# Patient Record
Sex: MALE | Race: WHITE | Employment: FULL TIME | URBAN - METROPOLITAN AREA
[De-identification: names, ages, dates, MRNs, and addresses within clinical notes are randomized per-mention and may not be internally consistent; named-entity substitution may affect disease eponyms.]

---

## 2018-08-23 ENCOUNTER — APPOINTMENT (OUTPATIENT)
Dept: GENERAL RADIOLOGY | Age: 23
End: 2018-08-23
Payer: COMMERCIAL

## 2018-08-23 ENCOUNTER — HOSPITAL ENCOUNTER (EMERGENCY)
Age: 23
Discharge: HOME | End: 2018-08-23
Attending: EMERGENCY MEDICINE
Payer: COMMERCIAL

## 2018-08-23 VITALS
HEIGHT: 70 IN | HEART RATE: 75 BPM | WEIGHT: 185 LBS | RESPIRATION RATE: 14 BRPM | OXYGEN SATURATION: 100 % | TEMPERATURE: 98 F | SYSTOLIC BLOOD PRESSURE: 141 MMHG | DIASTOLIC BLOOD PRESSURE: 83 MMHG | BODY MASS INDEX: 26.48 KG/M2

## 2018-08-23 DIAGNOSIS — S62.326A CLOSED DISPLACED FRACTURE OF SHAFT OF FIFTH METACARPAL BONE OF RIGHT HAND, INITIAL ENCOUNTER: ICD-10-CM

## 2018-08-23 DIAGNOSIS — S62.339A CLOSED BOXER'S FRACTURE, INITIAL ENCOUNTER: Primary | ICD-10-CM

## 2018-08-23 PROCEDURE — 73130 X-RAY EXAM OF HAND: CPT

## 2018-08-23 PROCEDURE — 6370000000 HC RX 637 (ALT 250 FOR IP): Performed by: EMERGENCY MEDICINE

## 2018-08-23 PROCEDURE — 4500000023 HC ED LEVEL 3 PROCEDURE

## 2018-08-23 PROCEDURE — 99283 EMERGENCY DEPT VISIT LOW MDM: CPT

## 2018-08-23 RX ORDER — LIDOCAINE HYDROCHLORIDE AND EPINEPHRINE 10; 10 MG/ML; UG/ML
20 INJECTION, SOLUTION INFILTRATION; PERINEURAL ONCE
Status: DISCONTINUED | OUTPATIENT
Start: 2018-08-23 | End: 2018-08-23

## 2018-08-23 RX ORDER — IBUPROFEN 600 MG/1
600 TABLET ORAL EVERY 6 HOURS PRN
Qty: 30 TABLET | Refills: 0 | Status: SHIPPED | OUTPATIENT
Start: 2018-08-23

## 2018-08-23 RX ORDER — BUDESONIDE AND FORMOTEROL FUMARATE DIHYDRATE 160; 4.5 UG/1; UG/1
2 AEROSOL RESPIRATORY (INHALATION) 2 TIMES DAILY
COMMUNITY

## 2018-08-23 RX ORDER — IBUPROFEN 400 MG/1
800 TABLET ORAL ONCE
Status: COMPLETED | OUTPATIENT
Start: 2018-08-23 | End: 2018-08-23

## 2018-08-23 RX ADMIN — IBUPROFEN 800 MG: 400 TABLET ORAL at 12:24

## 2018-08-23 ASSESSMENT — PAIN DESCRIPTION - LOCATION: LOCATION: HAND

## 2018-08-23 ASSESSMENT — PAIN DESCRIPTION - PAIN TYPE: TYPE: ACUTE PAIN

## 2018-08-23 ASSESSMENT — PAIN SCALES - GENERAL: PAINLEVEL_OUTOF10: 6

## 2018-08-23 ASSESSMENT — PAIN DESCRIPTION - ORIENTATION: ORIENTATION: RIGHT

## 2018-08-23 ASSESSMENT — PAIN DESCRIPTION - DESCRIPTORS: DESCRIPTORS: ACHING

## 2018-08-23 NOTE — ED NOTES
Covered right arm with gauze padding, 4\" ortho glass splint ulnar, and 2\" and 3\" Ace wrap, pt tolerated well.      [address] [place], [place]  08/23/18 5329
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
## 2018-08-23 NOTE — ED NOTES
Patient given prescription, work note, discharge instructions verbal and written, patient verbalized understanding. Alert/oriented X4, Clear speech.   Patient exhibits no distress, ambulates with steady gait per self leaving unit, no further request.     Conchis Sinha RN  08/23/18 0439

## 2018-08-23 NOTE — ED PROVIDER NOTES
EMERGENCY DEPARTMENT PHYSICIAN DOCUMENTATION      CHIEF COMPLAINT  Hand Injury (right)    Patient information was obtained from patient. History/Exam limitations: none. HISTORY OF PRESENT ILLNESS  Kimberly Martinez is a 26 y.o. male with complaint of Hand Injury (right)  . Injury occurred just pta. Mechanism: punched wall; no skin injury  Pain is throbbing, sharp, worse with touching and using. No radiation. No fevers or chills. REVIEW OF SYSTEMS  A full 10 point Review of Systems was performed and is negative aside from pertinent positives mentioned in HPI    ALLERGIES:  No Known Allergies    PAST HISTORY  Past Medical History:   Diagnosis Date    Asthma        History reviewed. No pertinent family history. No current facility-administered medications on file prior to encounter. No current outpatient prescriptions on file prior to encounter. Social History   Substance Use Topics    Smoking status: Current Some Day Smoker    Smokeless tobacco: Never Used    Alcohol use Yes      Comment: social         EXAM:   Presentation Vital Signs: BP (!) 141/83   Pulse 75   Temp 98 °F (36.7 °C)   Resp 14   Ht 5' 10\" (1.778 m)   Wt 83.9 kg (185 lb)   SpO2 100%   BMI 26.54 kg/m²   General: Well nourished, no acute distress  Head: No traumatic injury  ENT: MMM, no facial asymmetry, no nasal discharge  Eyes: EOM  Neck: No tracheal deviation or stridor  Skin: no pallor, erythema, lesions or other abnormalities on exposed skin of face and arms  Extremities: R 5th  Metacarpal is deformed mildly with overlap of distal fragment dorally over proximal fragment. No skin injury  Good cap refill all digits and radial pulse  Good extensor and flexor movements of all digits  Good sensatio clement digits  Neurologic: Alert, oriented x 3.   No focal deficits upon moving arms and legs  Psychiatric: Appropriate demeanor without agitation or internal stimulation      MEDICAL DECISION MAKING  Pt here with isolated R

## 2018-08-23 NOTE — LETTER
[address] Emergency Department  [address] [address]  Phone: [phone]  Fax: [phone]             August 24, 2018    Patient: Breanne Pelaez   YOB: 1995   Date of Visit: 8/23/2018       To Whom It May Concern:    Ronnie Colorado was seen and treated in our emergency department on 8/23/2018. He may return to work on 8/24/18 with light duty due to right hand injury. Must be cleared by ortho.       Sincerely,             Signature:__________________________________

## 2018-08-28 ENCOUNTER — TELEPHONE (OUTPATIENT)
Dept: ORTHOPEDIC SURGERY | Age: 23
End: 2018-08-28

## 2018-08-28 ENCOUNTER — OFFICE VISIT (OUTPATIENT)
Dept: ORTHOPEDIC SURGERY | Age: 23
End: 2018-08-28

## 2018-08-28 VITALS
DIASTOLIC BLOOD PRESSURE: 62 MMHG | BODY MASS INDEX: 26.48 KG/M2 | HEIGHT: 70 IN | SYSTOLIC BLOOD PRESSURE: 120 MMHG | HEART RATE: 73 BPM | WEIGHT: 185 LBS

## 2018-08-28 DIAGNOSIS — S62.326A CLOSED DISPLACED FRACTURE OF SHAFT OF FIFTH METACARPAL BONE OF RIGHT HAND, INITIAL ENCOUNTER: Primary | ICD-10-CM

## 2018-08-28 PROCEDURE — L3809 WHFO W/O JOINTS PRE OTS: HCPCS | Performed by: ORTHOPAEDIC SURGERY

## 2018-08-28 PROCEDURE — 4004F PT TOBACCO SCREEN RCVD TLK: CPT | Performed by: ORTHOPAEDIC SURGERY

## 2018-08-28 PROCEDURE — G8419 CALC BMI OUT NRM PARAM NOF/U: HCPCS | Performed by: ORTHOPAEDIC SURGERY

## 2018-08-28 PROCEDURE — G8427 DOCREV CUR MEDS BY ELIG CLIN: HCPCS | Performed by: ORTHOPAEDIC SURGERY

## 2018-08-28 PROCEDURE — 99203 OFFICE O/P NEW LOW 30 MIN: CPT | Performed by: ORTHOPAEDIC SURGERY

## 2018-08-28 RX ORDER — ALBUTEROL SULFATE 90 UG/1
2 AEROSOL, METERED RESPIRATORY (INHALATION) EVERY 6 HOURS PRN
COMMUNITY

## 2018-08-28 NOTE — LETTER
[facility]  [address] [address]  Phone: [phone]  Fax: [phone]    Lin Alicea MD        August 28, 2018     Patient: Naif Roberson   YOB: 1995   Date of Visit: 8/28/2018       To Whom It May Concern: It is my medical opinion that [facility] may return to light duty immediately with the following restrictions: no lifting or gripping with right hand. .    If you have any questions or concerns, please don't hesitate to call.     Sincerely,          Lin Alicea MD

## 2018-08-28 NOTE — PROGRESS NOTES
functional support and facilitate healing. The patient was educated and fit by a healthcare professional with expert knowledge and specialization in brace application while under the direct supervision of the physician. Verbal and written instructions for the use of and application of this item were provided. They were instructed to contact the office immediately should the brace result in increased pain, decreased sensation, increased swelling or worsening of the condition. Treatment Plan:  I discussed results of imaging with the patient today as well as treatment options for his right hand fracture. We discussed both operative and nonoperative intervention. The risks of nonoperative intervention include the possibility of continued malrotation, malunion or nonunion as well as continued immobilization in the possibility of stiffness. Benefits of surgical intervention including stabilization and early range of motion as well as more predictable stability and alignment of the fracture were discussed. We also discussed risks of surgery today. The risks and benefits of surgical fixation versus non-operative management were discussed thoroughly. These included, but were not limited to infection, tendon or nerve injury, stiffness or pain of the hand or fingers long-term, malunion, nonunion, malrotation, implant failure, tendon rupture, scar sensitivity, adverse effects of anesthesia (stroke or death), and possible need for hardware removal or further surgery such as tenolysis. Specifically, based on alignment and fracture orientation I would recommend surgical intervention as I think it would benefit the patient to allow for earlier range of motion as well as to correct his malalignment. In addition to comminution and location of fracture does makes this for a higher chance of further displacement.  My plan would be for open right reduction internal fixation with plate and screw construct and possible use

## 2018-08-29 ENCOUNTER — TELEPHONE (OUTPATIENT)
Dept: ORTHOPEDIC SURGERY | Age: 23
End: 2018-08-29

## 2018-08-29 NOTE — PROGRESS NOTES
[facility] / [facility] ([facility]) [address] [facility], [address]    Acknowledgment of Informed Consent for Surgical or Medical Procedure and Sedation  I agree to allow doctor(s) Jaden Simms and his/her associates or assistants, including residents and/or other qualified medical practitioner to perform the following medical treatment or procedure and to administer or direct the administration of sedation as necessary:  Procedure(s): OPERATIVE FIXATION OF RIGHT SMALL FINGER, METACARPAL SHAFT FRACTURE, INCLUDING OPEN REDUCTION INTERNAL FIXATION VERSUS PERCUTANEOUS PIN FIXATION   My doctor has explained the following regarding the proposed procedure:   the explanation of the procedure   the benefits of the procedure   the potential problems that might occur during recuperation   the risks and side effects of the procedure which could include but are not limited to severe blood loss, infection, stroke or death   the benefits, risks and side effect of alternative procedures including the consequences of declining this procedure or any alternative procedures   the likelihood of achieving satisfactory results. I acknowledge no guarantee or assurance has been made to me regarding the results. I understand that during the course of this treatment/procedure, unforeseen conditions can occur which require an additional or different procedure. I agree to allow my physician or assistants to perform such extension of the original procedure as they may find necessary. I understand that sedation will often result in temporary impairment of memory and fine motor skills and that sedation can occasionally progress to a state of deep sedation or general anesthesia. I understand the risks of anesthesia for surgery include, but are not limited to, sore throat, hoarseness, injury to face, mouth, or teeth; nausea; headache; injury to blood vessels or nerves; death, brain damage, or paralysis.     I understand

## 2018-08-30 ENCOUNTER — ANESTHESIA EVENT (OUTPATIENT)
Dept: OPERATING ROOM | Age: 23
End: 2018-08-30
Payer: COMMERCIAL

## 2018-08-30 RX ORDER — FLUTICASONE PROPIONATE 50 MCG
1 SPRAY, SUSPENSION (ML) NASAL PRN
COMMUNITY

## 2018-08-30 RX ORDER — CETIRIZINE HYDROCHLORIDE 10 MG/1
10 TABLET ORAL PRN
COMMUNITY

## 2018-08-30 NOTE — PROGRESS NOTES
[address] [employer]                          Date of Procedure 8/31/18 Time of Procedure 0730  PRIOR TO PROCEDURE DATE:  1. Please follow any guidelines/instructions prior to your procedure as advised by your surgeon. 2. Arrange for someone to drive you home and be with you for the first 24 hours after discharge for your safety after your procedure for which you received sedation. Ensure it is someone we can share information with regarding your discharge. 3. You must contact your surgeon for instructions IF:   You are taking any blood thinners, aspirin, anti-inflammatory or vitamin E.   There is a change in your physical condition such as a cold, fever, rash, cuts, sores or any other infection, especially near your surgical site. 4. Do not drink alcohol the day before or day of your procedure. 5. A Pre-op History and Physical for surgery MUST be completed by your Physician or Urgent Care within 30 days of your procedure date. Please bring a copy with you on the day of your procedure and along with any other testing performed. THE DAY OF YOUR PROCEDURE:  1. Follow instructions for ARRIVAL TIME as DIRECTED BY YOUR SURGEON. If your surgeon does not give you a specific arrival time, please arrive at 0530 PER PT    2. Enter the MAIN entrance from "[employer] and follow the signs to the free [employer] parking (offered free of charge 6am-5pm). 3. Enter the Main Entrance of the hospital (do not enter from the lower level of the parking garage). Upon entrance, check in with the  at the main desk on your left. If no one is available at the desk, proceed into the [facility] Waiting Room and go through the door directly into the [facility]. There is a Check-in desk ACROSS from Room 5 (marked with a sign hanging from the ceiling). The phone number for the surgery center is [phone].     4. Please call [phone] option #2 any large items you may need brought in by your family after your arrival to your hospital room. 15. If you have a Living Will or Durable Power of , please bring a copy on the day of your procedure. 15. With your permission, one family member may accompany you while you are being prepared for surgery. Once you are ready, additional family members may join you. HOW WE KEEP YOU SAFE and WORK TO PREVENT SURGICAL SITE INFECTIONS:  1. Health care workers should always check your ID bracelet to verify your name and birth date. You will be asked many times to state your name, date of birth, and allergies. 2. Health care workers should always clean their hands with soap or alcohol gel before providing care to you. It is okay to ask anyone if they cleaned their hands before they touch you. 3. You will be actively involved in verifying the type of procedure you are having and ensuring the correct surgical site. This will be confirmed multiple times prior to your procedure. Do NOT yvan your surgery site UNLESS instructed to by your surgeon. 4. Do not shave or wax for 72 hours prior to procedure near your operative site. Shaving with a razor can irritate your skin and make it easier to develop an infection. On the day of your procedure, any hair that needs to be removed near the surgical site will be clipped by a healthcare worker using a special clippers designed to avoid skin irritation. 5. When you are in the operating room, your surgical site will be cleansed with a special soap, and in most cases, you will be given an antibiotic before the surgery begins. What to expect AFTER YOUR PROCEDURE:  1. Immediately following your procedure, your will be taken to the PACU for the first phase of your recovery. Your nurse will help you recover from any potential side effects of anesthesia, such as extreme drowsiness, changes in your vital signs or breathing patterns.  Nausea, headache, muscle aches, or sore throat may also occur after anesthesia. Your nurse will help you manage these potential side effects. 2. For comfort and safety, arrange to have someone at home with you for the first 24 hours after discharge. 3. You and your family will be given written instructions about your diet, activity, dressing care, medications, and return visits. 4. Once at home, should issues with nausea, pain, or bleeding occur, or should you notice any signs of infection, you should call your surgeon. 5. Always clean your hands before and after caring for your wound. Do not let your family touch your surgery site without cleaning their hands. 6. Narcotic pain medications can cause significant constipation. You may want to add a stool softener to your postoperative medication schedule or speak to your surgeon on how best to manage this SIDE EFFECT. SPECIAL INSTRUCTIONS     Thank you for allowing us to care for you. We strive to exceed your expectations in the delivery of care and service provided to you and your family. If you need to contact us for any reason, please call us at [phone]    Instructions reviewed with patient during preadmission testing phone interview. Jeana Jones. 8/30/2018 .10:03 AM      ADDITIONAL EDUCATIONAL INFORMATION REVIEWED PER PHONE WITH YOU AND/OR YOUR FAMILY:  No Bring a urine sample on day of surgery  Yes Pain Goal-Taking Control of Your Pain  Yes FAQs about Surgical Site Infections  No Hibiclens® Bathing Instructions   Yes Antibacterial Soap  No Sesar® Wipes Bathing Instructions (Obtained from: https://www.[employer]/. pdf )  No Incentive Spirometer Education  No Other

## 2018-08-31 ENCOUNTER — HOSPITAL ENCOUNTER (OUTPATIENT)
Age: 23
Setting detail: OUTPATIENT SURGERY
Discharge: HOME | End: 2018-08-31
Attending: ORTHOPAEDIC SURGERY | Admitting: ORTHOPAEDIC SURGERY
Payer: COMMERCIAL

## 2018-08-31 ENCOUNTER — ANESTHESIA (OUTPATIENT)
Dept: OPERATING ROOM | Age: 23
End: 2018-08-31
Payer: COMMERCIAL

## 2018-08-31 VITALS
HEIGHT: 70 IN | BODY MASS INDEX: 26.48 KG/M2 | SYSTOLIC BLOOD PRESSURE: 124 MMHG | DIASTOLIC BLOOD PRESSURE: 72 MMHG | WEIGHT: 185 LBS | HEART RATE: 73 BPM | RESPIRATION RATE: 16 BRPM | OXYGEN SATURATION: 93 % | TEMPERATURE: 97.5 F

## 2018-08-31 VITALS — OXYGEN SATURATION: 99 % | DIASTOLIC BLOOD PRESSURE: 53 MMHG | SYSTOLIC BLOOD PRESSURE: 103 MMHG

## 2018-08-31 DIAGNOSIS — S62.326A CLOSED DISPLACED FRACTURE OF SHAFT OF FIFTH METACARPAL BONE OF RIGHT HAND, INITIAL ENCOUNTER: Primary | ICD-10-CM

## 2018-08-31 PROCEDURE — 7100000010 HC PHASE II RECOVERY - FIRST 15 MIN: Performed by: ORTHOPAEDIC SURGERY

## 2018-08-31 PROCEDURE — 7100000000 HC PACU RECOVERY - FIRST 15 MIN: Performed by: ORTHOPAEDIC SURGERY

## 2018-08-31 PROCEDURE — 6360000002 HC RX W HCPCS: Performed by: ANESTHESIOLOGY

## 2018-08-31 PROCEDURE — 6360000002 HC RX W HCPCS: Performed by: ORTHOPAEDIC SURGERY

## 2018-08-31 PROCEDURE — 7100000011 HC PHASE II RECOVERY - ADDTL 15 MIN: Performed by: ORTHOPAEDIC SURGERY

## 2018-08-31 PROCEDURE — 2500000003 HC RX 250 WO HCPCS

## 2018-08-31 PROCEDURE — 2580000003 HC RX 258: Performed by: ORTHOPAEDIC SURGERY

## 2018-08-31 PROCEDURE — 7100000001 HC PACU RECOVERY - ADDTL 15 MIN: Performed by: ORTHOPAEDIC SURGERY

## 2018-08-31 PROCEDURE — 3700000000 HC ANESTHESIA ATTENDED CARE: Performed by: ORTHOPAEDIC SURGERY

## 2018-08-31 PROCEDURE — 6370000000 HC RX 637 (ALT 250 FOR IP): Performed by: ORTHOPAEDIC SURGERY

## 2018-08-31 PROCEDURE — 64415 NJX AA&/STRD BRCH PLXS IMG: CPT | Performed by: ANESTHESIOLOGY

## 2018-08-31 PROCEDURE — 3700000001 HC ADD 15 MINUTES (ANESTHESIA): Performed by: ORTHOPAEDIC SURGERY

## 2018-08-31 PROCEDURE — 3600000014 HC SURGERY LEVEL 4 ADDTL 15MIN: Performed by: ORTHOPAEDIC SURGERY

## 2018-08-31 PROCEDURE — 6360000002 HC RX W HCPCS

## 2018-08-31 PROCEDURE — 2709999900 HC NON-CHARGEABLE SUPPLY: Performed by: ORTHOPAEDIC SURGERY

## 2018-08-31 PROCEDURE — 3600000004 HC SURGERY LEVEL 4 BASE: Performed by: ORTHOPAEDIC SURGERY

## 2018-08-31 PROCEDURE — 2580000003 HC RX 258: Performed by: ANESTHESIOLOGY

## 2018-08-31 PROCEDURE — 2580000003 HC RX 258

## 2018-08-31 PROCEDURE — C1713 ANCHOR/SCREW BN/BN,TIS/BN: HCPCS | Performed by: ORTHOPAEDIC SURGERY

## 2018-08-31 DEVICE — IMPLANTABLE DEVICE: Type: IMPLANTABLE DEVICE | Site: FINGERS | Status: FUNCTIONAL

## 2018-08-31 RX ORDER — HYDROCODONE BITARTRATE AND ACETAMINOPHEN 5; 325 MG/1; MG/1
1 TABLET ORAL
Status: COMPLETED | OUTPATIENT
Start: 2018-08-31 | End: 2018-08-31

## 2018-08-31 RX ORDER — FENTANYL CITRATE 50 UG/ML
INJECTION, SOLUTION INTRAMUSCULAR; INTRAVENOUS PRN
Status: DISCONTINUED | OUTPATIENT
Start: 2018-08-31 | End: 2018-08-31 | Stop reason: SDUPTHER

## 2018-08-31 RX ORDER — SODIUM CHLORIDE 0.9 % (FLUSH) 0.9 %
10 SYRINGE (ML) INJECTION EVERY 12 HOURS SCHEDULED
Status: DISCONTINUED | OUTPATIENT
Start: 2018-08-31 | End: 2018-08-31 | Stop reason: HOSPADM

## 2018-08-31 RX ORDER — FENTANYL CITRATE 50 UG/ML
50 INJECTION, SOLUTION INTRAMUSCULAR; INTRAVENOUS EVERY 5 MIN PRN
Status: DISCONTINUED | OUTPATIENT
Start: 2018-08-31 | End: 2018-08-31 | Stop reason: HOSPADM

## 2018-08-31 RX ORDER — LABETALOL HYDROCHLORIDE 5 MG/ML
5 INJECTION, SOLUTION INTRAVENOUS EVERY 10 MIN PRN
Status: DISCONTINUED | OUTPATIENT
Start: 2018-08-31 | End: 2018-08-31 | Stop reason: HOSPADM

## 2018-08-31 RX ORDER — FENTANYL CITRATE 50 UG/ML
100 INJECTION, SOLUTION INTRAMUSCULAR; INTRAVENOUS ONCE
Status: COMPLETED | OUTPATIENT
Start: 2018-08-31 | End: 2018-08-31

## 2018-08-31 RX ORDER — SODIUM CHLORIDE 0.9 % (FLUSH) 0.9 %
10 SYRINGE (ML) INJECTION PRN
Status: DISCONTINUED | OUTPATIENT
Start: 2018-08-31 | End: 2018-08-31 | Stop reason: HOSPADM

## 2018-08-31 RX ORDER — SODIUM CHLORIDE, SODIUM LACTATE, POTASSIUM CHLORIDE, CALCIUM CHLORIDE 600; 310; 30; 20 MG/100ML; MG/100ML; MG/100ML; MG/100ML
INJECTION, SOLUTION INTRAVENOUS CONTINUOUS PRN
Status: DISCONTINUED | OUTPATIENT
Start: 2018-08-31 | End: 2018-08-31 | Stop reason: SDUPTHER

## 2018-08-31 RX ORDER — ROPIVACAINE HYDROCHLORIDE 5 MG/ML
INJECTION, SOLUTION EPIDURAL; INFILTRATION; PERINEURAL
Status: DISCONTINUED
Start: 2018-08-31 | End: 2018-08-31 | Stop reason: HOSPADM

## 2018-08-31 RX ORDER — LIDOCAINE HYDROCHLORIDE 10 MG/ML
1 INJECTION, SOLUTION EPIDURAL; INFILTRATION; INTRACAUDAL; PERINEURAL
Status: DISCONTINUED | OUTPATIENT
Start: 2018-08-31 | End: 2018-08-31 | Stop reason: HOSPADM

## 2018-08-31 RX ORDER — ONDANSETRON 2 MG/ML
INJECTION INTRAMUSCULAR; INTRAVENOUS PRN
Status: DISCONTINUED | OUTPATIENT
Start: 2018-08-31 | End: 2018-08-31 | Stop reason: SDUPTHER

## 2018-08-31 RX ORDER — HYDROCODONE BITARTRATE AND ACETAMINOPHEN 5; 325 MG/1; MG/1
1 TABLET ORAL EVERY 6 HOURS PRN
Qty: 18 TABLET | Refills: 0 | Status: SHIPPED | OUTPATIENT
Start: 2018-08-31 | End: 2018-09-03

## 2018-08-31 RX ORDER — ONDANSETRON 2 MG/ML
4 INJECTION INTRAMUSCULAR; INTRAVENOUS
Status: DISCONTINUED | OUTPATIENT
Start: 2018-08-31 | End: 2018-08-31 | Stop reason: HOSPADM

## 2018-08-31 RX ORDER — SODIUM CHLORIDE, SODIUM LACTATE, POTASSIUM CHLORIDE, CALCIUM CHLORIDE 600; 310; 30; 20 MG/100ML; MG/100ML; MG/100ML; MG/100ML
INJECTION, SOLUTION INTRAVENOUS CONTINUOUS
Status: DISCONTINUED | OUTPATIENT
Start: 2018-08-31 | End: 2018-08-31 | Stop reason: HOSPADM

## 2018-08-31 RX ORDER — MIDAZOLAM HYDROCHLORIDE 1 MG/ML
INJECTION INTRAMUSCULAR; INTRAVENOUS PRN
Status: DISCONTINUED | OUTPATIENT
Start: 2018-08-31 | End: 2018-08-31 | Stop reason: SDUPTHER

## 2018-08-31 RX ORDER — MAGNESIUM HYDROXIDE 1200 MG/15ML
LIQUID ORAL CONTINUOUS PRN
Status: DISCONTINUED | OUTPATIENT
Start: 2018-08-31 | End: 2018-08-31 | Stop reason: HOSPADM

## 2018-08-31 RX ORDER — DEXAMETHASONE SODIUM PHOSPHATE 4 MG/ML
INJECTION, SOLUTION INTRA-ARTICULAR; INTRALESIONAL; INTRAMUSCULAR; INTRAVENOUS; SOFT TISSUE PRN
Status: DISCONTINUED | OUTPATIENT
Start: 2018-08-31 | End: 2018-08-31 | Stop reason: SDUPTHER

## 2018-08-31 RX ORDER — MIDAZOLAM HYDROCHLORIDE 1 MG/ML
2 INJECTION INTRAMUSCULAR; INTRAVENOUS
Status: COMPLETED | OUTPATIENT
Start: 2018-08-31 | End: 2018-08-31

## 2018-08-31 RX ORDER — MEPERIDINE HYDROCHLORIDE 25 MG/ML
12.5 INJECTION INTRAMUSCULAR; INTRAVENOUS; SUBCUTANEOUS EVERY 5 MIN PRN
Status: DISCONTINUED | OUTPATIENT
Start: 2018-08-31 | End: 2018-08-31 | Stop reason: HOSPADM

## 2018-08-31 RX ORDER — DOCUSATE SODIUM 100 MG/1
100 CAPSULE, LIQUID FILLED ORAL 2 TIMES DAILY PRN
Qty: 40 CAPSULE | Refills: 0 | Status: SHIPPED | OUTPATIENT
Start: 2018-08-31

## 2018-08-31 RX ORDER — LIDOCAINE HYDROCHLORIDE 20 MG/ML
INJECTION, SOLUTION EPIDURAL; INFILTRATION; INTRACAUDAL; PERINEURAL PRN
Status: DISCONTINUED | OUTPATIENT
Start: 2018-08-31 | End: 2018-08-31 | Stop reason: SDUPTHER

## 2018-08-31 RX ORDER — PROPOFOL 10 MG/ML
INJECTION, EMULSION INTRAVENOUS PRN
Status: DISCONTINUED | OUTPATIENT
Start: 2018-08-31 | End: 2018-08-31 | Stop reason: SDUPTHER

## 2018-08-31 RX ORDER — MORPHINE SULFATE 2 MG/ML
2 INJECTION, SOLUTION INTRAMUSCULAR; INTRAVENOUS EVERY 5 MIN PRN
Status: DISCONTINUED | OUTPATIENT
Start: 2018-08-31 | End: 2018-08-31 | Stop reason: HOSPADM

## 2018-08-31 RX ADMIN — HYDROCODONE BITARTRATE AND ACETAMINOPHEN 1 TABLET: 5; 325 TABLET ORAL at 11:47

## 2018-08-31 RX ADMIN — LIDOCAINE HYDROCHLORIDE 50 MG: 20 INJECTION, SOLUTION EPIDURAL; INFILTRATION; INTRACAUDAL; PERINEURAL at 08:45

## 2018-08-31 RX ADMIN — DEXAMETHASONE SODIUM PHOSPHATE 4 MG: 4 INJECTION, SOLUTION INTRAMUSCULAR; INTRAVENOUS at 10:15

## 2018-08-31 RX ADMIN — Medication 2 G: at 08:56

## 2018-08-31 RX ADMIN — FENTANYL CITRATE 50 MCG: 50 INJECTION INTRAMUSCULAR; INTRAVENOUS at 11:09

## 2018-08-31 RX ADMIN — FENTANYL CITRATE 50 MCG: 50 INJECTION INTRAMUSCULAR; INTRAVENOUS at 09:00

## 2018-08-31 RX ADMIN — SODIUM CHLORIDE, POTASSIUM CHLORIDE, SODIUM LACTATE AND CALCIUM CHLORIDE: 600; 310; 30; 20 INJECTION, SOLUTION INTRAVENOUS at 06:30

## 2018-08-31 RX ADMIN — PROPOFOL 200 MG: 10 INJECTION, EMULSION INTRAVENOUS at 08:45

## 2018-08-31 RX ADMIN — SODIUM CHLORIDE, SODIUM LACTATE, POTASSIUM CHLORIDE, AND CALCIUM CHLORIDE: 600; 310; 30; 20 INJECTION, SOLUTION INTRAVENOUS at 08:35

## 2018-08-31 RX ADMIN — MIDAZOLAM HYDROCHLORIDE 2 MG: 2 INJECTION, SOLUTION INTRAMUSCULAR; INTRAVENOUS at 07:32

## 2018-08-31 RX ADMIN — FENTANYL CITRATE 100 MCG: 50 INJECTION, SOLUTION INTRAMUSCULAR; INTRAVENOUS at 07:32

## 2018-08-31 RX ADMIN — ONDANSETRON 4 MG: 2 INJECTION INTRAMUSCULAR; INTRAVENOUS at 10:15

## 2018-08-31 RX ADMIN — Medication 2 G: at 08:40

## 2018-08-31 RX ADMIN — MIDAZOLAM HYDROCHLORIDE 2 MG: 1 INJECTION INTRAMUSCULAR; INTRAVENOUS at 08:40

## 2018-08-31 RX ADMIN — SODIUM CHLORIDE, SODIUM LACTATE, POTASSIUM CHLORIDE, AND CALCIUM CHLORIDE: 600; 310; 30; 20 INJECTION, SOLUTION INTRAVENOUS at 09:25

## 2018-08-31 RX ADMIN — FENTANYL CITRATE 50 MCG: 50 INJECTION INTRAMUSCULAR; INTRAVENOUS at 08:45

## 2018-08-31 ASSESSMENT — PULMONARY FUNCTION TESTS
PIF_VALUE: 16
PIF_VALUE: 14
PIF_VALUE: 16
PIF_VALUE: 13
PIF_VALUE: 16
PIF_VALUE: 15
PIF_VALUE: 16
PIF_VALUE: 9
PIF_VALUE: 16
PIF_VALUE: 10
PIF_VALUE: 16
PIF_VALUE: 8
PIF_VALUE: 15
PIF_VALUE: 9
PIF_VALUE: 16
PIF_VALUE: 1
PIF_VALUE: 16
PIF_VALUE: 9
PIF_VALUE: 16
PIF_VALUE: 9
PIF_VALUE: 16
PIF_VALUE: 6
PIF_VALUE: 1
PIF_VALUE: 16
PIF_VALUE: 1
PIF_VALUE: 16
PIF_VALUE: 9
PIF_VALUE: 16
PIF_VALUE: 14
PIF_VALUE: 16
PIF_VALUE: 16
PIF_VALUE: 13
PIF_VALUE: 16
PIF_VALUE: 16
PIF_VALUE: 14
PIF_VALUE: 16
PIF_VALUE: 8
PIF_VALUE: 16
PIF_VALUE: 8
PIF_VALUE: 16
PIF_VALUE: 1
PIF_VALUE: 9
PIF_VALUE: 16
PIF_VALUE: 14
PIF_VALUE: 9
PIF_VALUE: 16
PIF_VALUE: 8
PIF_VALUE: 16
PIF_VALUE: 1
PIF_VALUE: 16
PIF_VALUE: 6
PIF_VALUE: 16
PIF_VALUE: 1
PIF_VALUE: 16
PIF_VALUE: 14
PIF_VALUE: 16
PIF_VALUE: 16
PIF_VALUE: 9

## 2018-08-31 ASSESSMENT — PAIN - FUNCTIONAL ASSESSMENT
PAIN_FUNCTIONAL_ASSESSMENT: 0-10
PAIN_FUNCTIONAL_ASSESSMENT: 0-10

## 2018-08-31 ASSESSMENT — PAIN SCALES - GENERAL
PAINLEVEL_OUTOF10: 7
PAINLEVEL_OUTOF10: 4
PAINLEVEL_OUTOF10: 0
PAINLEVEL_OUTOF10: 6

## 2018-08-31 NOTE — ANESTHESIA PRE PROCEDURE
allergies  Causes nasal cogestion       Problem List:    Patient Active Problem List   Diagnosis Code    Closed displaced fracture of shaft of fifth metacarpal bone of right hand S62.326A       Past Medical History:        Diagnosis Date    Asthma     Eczema     ARMS    Seasonal allergies        Past Surgical History:        Procedure Laterality Date    FRACTURE SURGERY Right 08/31/2018    5th metatarsal       Social History:    Social History   Substance Use Topics    Smoking status: Current Some Day Smoker    Smokeless tobacco: Never Used      Comment: SOCIALLY    Alcohol use Yes      Comment: social                                Ready to quit: Yes  Counseling given: No      Vital Signs (Current):   Vitals:    08/30/18 0959 08/31/18 0612   BP:  121/71   Pulse:  51   Resp:  16   Temp:  97.9 °F (36.6 °C)   TempSrc:  Oral   SpO2:  97%   Weight: 185 lb (83.9 kg) 185 lb (83.9 kg)   Height: 5' 10\" (1.778 m) 5' 10\" (1.778 m)                                              BP Readings from Last 3 Encounters:   08/31/18 121/71   08/28/18 120/62   08/23/18 (!) 141/83       NPO Status: Time of last liquid consumption: 2330                        Time of last solid consumption: 2330                        Date of last liquid consumption: 08/30/18                        Date of last solid food consumption: 08/30/18    BMI:   Wt Readings from Last 3 Encounters:   08/31/18 185 lb (83.9 kg)   08/28/18 185 lb (83.9 kg)   08/23/18 185 lb (83.9 kg)     Body mass index is 26.54 kg/m². CBC: No results found for: WBC, RBC, HGB, HCT, MCV, RDW, PLT    CMP: No results found for: NA, K, CL, CO2, BUN, CREATININE, GFRAA, AGRATIO, LABGLOM, GLUCOSE, PROT, CALCIUM, BILITOT, ALKPHOS, AST, ALT    POC Tests: No results for input(s): POCGLU, POCNA, POCK, POCCL, POCBUN, POCHEMO, POCHCT in the last 72 hours.     Coags: No results found for: PROTIME, INR, APTT    HCG (If Applicable): No results found for: PREGTESTUR, PREGSERUM, HCG, HCGQUANT ABGs: No results found for: PHART, PO2ART, JYK0DXM, JYD5MGX, BEART, F0CIMGOM     Type & Screen (If Applicable):  No results found for: [facility]    Anesthesia Evaluation  Patient summary reviewed  Airway: Mallampati: II  TM distance: <3 FB   Neck ROM: full  Mouth opening: > = 3 FB Dental: normal exam         Pulmonary:normal exam    (+) asthma:                            Cardiovascular:Negative CV ROS                      Neuro/Psych:   Negative Neuro/Psych ROS              GI/Hepatic/Renal: Neg GI/Hepatic/Renal ROS            Endo/Other: Negative Endo/Other ROS                    Abdominal:           Vascular:                                        Anesthesia Plan      general     ASA 2       Induction: intravenous. Anesthetic plan and risks discussed with patient. Plan discussed with CRNA.     Attending anesthesiologist reviewed and agrees with Margarita Ross MD   8/31/2018

## 2018-08-31 NOTE — H&P
I have reviewed the history and physical and examined the patient and find no relevant changes. I have reviewed with the patient and/or family the risks, benefits, and alternatives to the procedure.     Emely Car MD  8/31/2018

## 2018-08-31 NOTE — PROGRESS NOTES
Time out done for right interscalene block  IV meds given as ordered  O2 3liters per cannula applied to pt. Block completed and tolerated well by pt.   Vs stable   Pt awake but drowsy  To OR per stretcher with OR team present

## 2018-09-01 NOTE — OP NOTE
[facility], [address]                                 OPERATIVE REPORT    PATIENT NAME: ANABELA HOLT                    :        1995  MED REC NO:   [id number]                          ROOM:  ACCOUNT NO:   [de-identified]                           ADMIT DATE: 2018  PROVIDER:     Sebastián Simpson MD    DATE OF PROCEDURE:  2018    PREOPERATIVE DIAGNOSIS:  Right small finger metacarpal shaft fracture,  closed. POSTOPERATIVE DIAGNOSIS:  Right small finger metacarpal shaft fracture,  closed. OPERATIONS PERFORMED:  1. Open reduction with internal fixation with K-wire fixation of right  small finger metacarpal shaft fracture. 2.  Interpretation of three views of right hand. PRIMARY SURGEON:  Sebastián Simpson MD    ASSISTANT:  [facility] surgical assistant. ANESTHESIA:  General plus regional block, right upper extremity. This is a  right small finger metacarpal shaft fracture. BLOOD LOSS:  None. IMPLANTS:  Titanium 0.045 K-wire x2. SPECIMENS:  None. COMPLICATIONS:  None immediate apparent. BRIEF HISTORY:  The patient is a 61-year-old male with a history of injury  to his right hand. He sustained subsequent right small finger metacarpal  shaft fracture with displacement. Based on the location, I felt that this  was an unstable injury with evidence of displacement and discussed with him  operative and nonoperative intervention. We opted for operative  intervention discussing risks, benefits, and alternatives with risks  include, but not limited to infection, bleeding, damage to nerves and blood  vessels, need for additional procedures, continued pain, risks of  anesthesia including stroke, heart attack, blood clot, and death as well as  malunion, nonunion. He understood all these risks and elected to proceed  and consent was obtained prior to surgery.     OPERATIVE PROCEDURE:  The patient was noted that  there was more significant comminution that had been anticipated  particularly on the distal aspect of the fracture. At this point,  compression plating or plate and screw fixation, I do not think that this  would be amenable to a plate and screw fixation because of the amount of  comminution and the concern for inadequate screw purchase. Similarly,  bridge plating technique would not be possible because there was not enough  distal purchase to allow for bridge plating technique in this area as well. Again, the fracture was cleaned and provisional reduction was performed. With the amount of comminution, I did think that at this point an open  reduction could be performed, but K-wire fixation would be most beneficial  to this patient in order to preserve and achieve stability, but to have  adequate purchase within the fracture. Therefore, we then proceeded to  perform a reduction using traction and gentle manipulation of the fracture. This was held provisionally manually and at this point, titanium 0.045  K-wires were then placed. These were placed near the small finger  metacarpal head and then advanced to the fracture site. Two K-wires were  placed, not crossing at the fracture site. They were advanced across the  fracture site and advanced within the base of the small finger metacarpal.   Multiple images demonstrated appropriate position of the K-wires and  appropriate length, alignment, and rotation of the fracture at this point. Gentle manipulation demonstrated appropriate and adequate purchase as well  as adequate stability at the fracture with no evidence of loosening. There  was a dorsal fragment that was not able to be captured at the fracture  site, but this was kept in place and did have soft tissue stability  surrounding it. Titanium wires were used as the patient did have a NICKEL  allergy. Once these wires had been placed, clinically the finger was  evaluated.   There was no

## 2018-09-04 ENCOUNTER — TELEPHONE (OUTPATIENT)
Dept: ORTHOPEDIC SURGERY | Age: 23
End: 2018-09-04

## 2018-09-10 ENCOUNTER — TELEPHONE (OUTPATIENT)
Dept: ORTHOPEDIC SURGERY | Age: 23
End: 2018-09-10

## 2018-09-10 NOTE — TELEPHONE ENCOUNTER
PATIENT CALLED STATING HE FEELS SICK TO HIS STOMACH & WAS CONCERNED RATHER OR NOT  THIS IS DUE TO  HIS HAND. STATES HE DOESN'T BELIEVE HE HAS A FEVER AND STATED HIS HAND FELT FINE. PATIENT ALSO STATES HE'S ONLY BEEN TAKING IBUPROFEN . ADVISED PATIENT IF SYMPTOMS GET WORSE  TO CONTACT THE OFFICE BACK OR HIS PCP. PATIENT SAYS HE TOOK SOME MEDICINE FOR HIS UPSET  STOMACH  & IT HELPED.

## 2018-09-12 ENCOUNTER — OFFICE VISIT (OUTPATIENT)
Dept: ORTHOPEDIC SURGERY | Age: 23
End: 2018-09-12

## 2018-09-12 DIAGNOSIS — S62.326A CLOSED DISPLACED FRACTURE OF SHAFT OF FIFTH METACARPAL BONE OF RIGHT HAND, INITIAL ENCOUNTER: Primary | ICD-10-CM

## 2018-09-12 PROCEDURE — 99024 POSTOP FOLLOW-UP VISIT: CPT | Performed by: ORTHOPAEDIC SURGERY

## 2018-09-14 ENCOUNTER — TELEPHONE (OUTPATIENT)
Dept: ORTHOPEDIC SURGERY | Age: 23
End: 2018-09-14

## 2018-09-26 ENCOUNTER — OFFICE VISIT (OUTPATIENT)
Dept: ORTHOPEDIC SURGERY | Age: 23
End: 2018-09-26

## 2018-09-26 DIAGNOSIS — S62.326A CLOSED DISPLACED FRACTURE OF SHAFT OF FIFTH METACARPAL BONE OF RIGHT HAND, INITIAL ENCOUNTER: Primary | ICD-10-CM

## 2018-09-26 PROCEDURE — 99024 POSTOP FOLLOW-UP VISIT: CPT | Performed by: ORTHOPAEDIC SURGERY

## 2018-09-26 NOTE — LETTER
[facility]  [address] [address]  Phone: [phone]  Fax: [phone]    Mike Butler MD        September 26, 2018     Patient: Logan Villa   YOB: 1995   Date of Visit: 9/12/2018       To Whom It May Concern: It is my medical opinion that Aster Arthur was seen in my office today. If you have any questions or concerns, please don't hesitate to call.     Sincerely,      Kareem Harman MD

## 2018-09-26 NOTE — LETTER
[facility]  [address] [address]  Phone: [phone]  Fax: [phone]    Sherle Siemens, MD        September 26, 2018     Patient: Mark Ca   YOB: 1995   Date of Visit: 9/26/2018       To Whom It May Concern: It is my medical opinion that Breanna Johnson was seen in my office. If you have any questions or concerns, please don't hesitate to call.     Sincerely,      Mamie Ma MD

## 2018-09-26 NOTE — PROGRESS NOTES
Chief Complaint:  Follow-up (Right hand)      History of Present of Illness:  Annie Lopez is a 37-year-old male presenting his repeat scheduled postoperative visit after his right hand surgery  Procedure: Open reduction with K wire fixation of right small finger metacarpal shaft fracture  Date of procedure: 8/31/2018  He presents today approximately 3-1/2 weeks status post surgery. He has noticed continued stiffness particularly at the MP joint of the small finger but overall improvement in swelling and improvement in pain in the right hand. He has been intermittently wearing his brace as instructed. No fever or chills or other constitutional symptoms and he has not observed any other changes in his hand. Examination:  General: Pleasant, no acute distress, well-appearing  Right hand:    Dorsal hand incision healed with minimal swelling and mobile scar  Pins clean dry and intact with no drainage or erythema or fluctuance surrounding the pins  Minimal tenderness at fracture site along the shaft of small finger metacarpal  No angulation or malrotation of finger including small finger  Normal tenodesis with MP extensor lag approximately 20°, patient is able to actively extend the finger, limited extension beyond 20° secondary to pain and impingement of extensor mechanism and MP joint  No crepitus with range of motion of finger, full composite fist and achieved  Sensation intact radial and ulnar aspect of all fingers and intact median ulnar and radial nerve distribution without deficit next line capillary refill brisk all fingers    Radiology:     X-rays obtained and reviewed in office:  Views 3  Location right hand  Impression there is interval healing with callus formation of the metacarpal shaft fracture with no change and pin position.  No evidence of change in overall alignment with no new malrotation or angulation of fracture  No additional bony abnormalities    Orders Placed This Encounter   Procedures    XR HAND

## 2018-10-10 ENCOUNTER — OFFICE VISIT (OUTPATIENT)
Dept: ORTHOPEDIC SURGERY | Age: 23
End: 2018-10-10
Payer: COMMERCIAL

## 2018-10-10 DIAGNOSIS — S62.326A CLOSED DISPLACED FRACTURE OF SHAFT OF FIFTH METACARPAL BONE OF RIGHT HAND, INITIAL ENCOUNTER: Primary | ICD-10-CM

## 2018-10-10 PROCEDURE — 99024 POSTOP FOLLOW-UP VISIT: CPT | Performed by: ORTHOPAEDIC SURGERY

## 2018-10-10 PROCEDURE — 20670 REMOVAL IMPLANT SUPERFICIAL: CPT | Performed by: ORTHOPAEDIC SURGERY

## 2018-10-10 NOTE — PROGRESS NOTES
position compared to previous images after pin removal  No evidence of degenerative changes or additional osseous abnormalities    Orders Placed This Encounter   Procedures    XR HAND RIGHT (MIN 3 VIEWS)    MN REMOVAL DEEP IMPLANT       Impression:  Encounter Diagnosis   Name Primary?  Closed displaced fracture of shaft of fifth metacarpal bone of right hand, initial encounter Yes         Treatment Plan:  I discussed treatment at this point with the patient  I think is appropriate for pin removal today as he is nearly 6 weeks status post surgery  Using sterile technique, pins were gently grasped using needle  and removed from the right hand without event or difficulty. The patient tolerated the procedure well. A sterile dressing was applied to the pin site wounds. Appropriate for the patient to shower in 48 hours after pin removal. No heavy lifting or gripping with the right hand.     We will begin formal range of motion, home range of motion program provided with the patient for the finger active as tolerated MP and IP joints  I do think he would benefit from formal guidance with occupational therapy as well and have given him a formal referral there as well  He may begin some light strengthening starting in several weeks for the hand, intermittently wear the brace at home but may begin weaning out of his brace over the next several weeks  He will continue to wear his brace while at work otherwise  [facility] plan for follow-up in approximately 3 weeks for repeat evaluation to evaluate his range of motion as well as his function

## 2018-10-11 ENCOUNTER — HOSPITAL ENCOUNTER (OUTPATIENT)
Dept: OCCUPATIONAL THERAPY | Age: 23
Setting detail: THERAPIES SERIES
Discharge: HOME | End: 2018-10-11
Payer: COMMERCIAL

## 2018-10-11 PROCEDURE — G8985 CARRY GOAL STATUS: HCPCS | Performed by: OCCUPATIONAL THERAPIST

## 2018-10-11 PROCEDURE — 97110 THERAPEUTIC EXERCISES: CPT | Performed by: OCCUPATIONAL THERAPIST

## 2018-10-11 PROCEDURE — 97165 OT EVAL LOW COMPLEX 30 MIN: CPT | Performed by: OCCUPATIONAL THERAPIST

## 2018-10-11 PROCEDURE — 97140 MANUAL THERAPY 1/> REGIONS: CPT | Performed by: OCCUPATIONAL THERAPIST

## 2018-10-11 PROCEDURE — G8984 CARRY CURRENT STATUS: HCPCS | Performed by: OCCUPATIONAL THERAPIST

## 2018-10-11 PROCEDURE — G0283 ELEC STIM OTHER THAN WOUND: HCPCS | Performed by: OCCUPATIONAL THERAPIST

## 2018-10-11 NOTE — PLAN OF CARE
IR/ER          Edema:     SF MP mod         Strength:      II deferred    Lateral Pinch     3 Point Pinch     Tip Pinch     MMT:            Observations (including splints, bandages, incisions, scars):   Pin site closed with no signs of infection    Sensation: [x] No reported deficits  [] Intact to light touch    [] [place] Cem test completed, findings as noted:  [] Other:    Palpation: unremarkable    Functional Mobility/Transfers/Gait: [x] Independent - no significant gait deviations  [] Assistance needed   [] Assistive device used: Falls Risk Assessment (30 days):   [x] Falls Risk assessed and no intervention required. [] Falls Risk assessed and Patient requires intervention due to being higher risk   TUG score (>12s at risk):     [] Falls education provided, including      Review Of Systems (ROS): [x]Performed Review of systems (Integumentary, CardioPulmonary, Neurological) by intake and observation. Intake form has been scanned into medical record. Patient has been instructed to contact their primary care physician regarding ROS issues if not already being addressed at this time. ASSESSMENT:   This patient presents with signs and symptoms consistent with the medical diagnosis provided by the referring physician.      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   [] Functional Mobility   [] Sleeping/Rest   [] Toileting/Hygiene   [] Recreational Activities   [] Driving    [] Community/Social Participation   [x] Other: heavy lifting, tight gripping,    Rehab medical/therapy history, which includes:   [x] a brief history including medical and/or therapy records relating to the     presenting problem   [] an expanded review of medical and/or therapy records and additional review     of physical, cognitive or psychosocial history related to current functional    performance   [] an extensive additional review of review of medical and/or therapy records   and physical, cognitive, or psychosocial history related to current    functional performance    [] An assessment that identifies performance deficits (relating to physical, cognitive, or psychosocial skills) that result in activity limitations and/or participation restrictions:   [] 1-3 performance deficits   [x] 3-5 performance deficits   [] 5 or more performance deficits    [] Clinical decision making of:   [x] low complexity, including analysis of occupational profile, data analysis from problem focused assessment, and consideration of a limited number of treatment options. No comorbidities affect occupational performance. No task modifications or assistance needed to complete evaluation. [] moderate complexity, including analysis of occupational profile, data analysis from detailed assessment and consideration of several treatment options. Comorbidities that affect occupational performance may be present. Minimal to moderate task modifications or assistance needed to complete assessment. [] high complexity, including analysis of occupational profile, analysis of data from comprehensive assessment and consideration of multiple treatment options. Multiple comorbidities present that affect occupational performance. Significant task modifications or assistance needed to complete assessment.     Evaluation Code:  [x] Low Complexity EVAL 13222 (typically 30 minutes face to face)  [] Mod Complexity EVAL 31846 (typically 45 minutes face to face)  [] High Complexity EVAL 61264 (typically 60 minutes face to

## 2018-10-12 NOTE — FLOWSHEET NOTE
[facility] [place] [address]  Phone [phone]      Fax [phone]    Hand Therapy Daily Treatment Note  Date:  10/12/2018    Patient: Karla Godinez   : 1995   MRN: [id number]  Referring Physician: Referring Practitioner: Diamante Stokes       Medical Diagnosis Information:  Diagnosis: R 5th closed displaced [facility] fracture  S62.326A    Treatment Diagnosis: R hand stiffness  M25.641                                         Insurance information: OT Insurance Information: [facility] $4500  has been met   $25/visit     20 OT  20 PT  Date of Injury:     Date of Surgery: 18        Visit # Insurance Allowable   1 20     Date of Patient follow up with Physician: 3wks    G-Codes:  OT G-codes  Functional Assessment Tool Used: Q  Score: 30%  Functional Limitation: Carrying, moving and handling objects  Carrying, Moving and Handling Objects Current Status (): At least 20 percent but less than 40 percent impaired, limited or restricted  Carrying, Moving and Handling Objects Goal Status (): At least 1 percent but less than 20 percent impaired, limited or restricted    Progress Note: []  Yes  [x]  No  Next due by: Visit #10      Latex Allergy:  [x]NO      []YES    Preferred Language for Healthcare:   [x]English       []other:    Pain level:  2-3/10     SUBJECTIVE:    Background/Relevant Medical & Therapy History:    Pt.  Hit wall resulting in 5th MC fracture 18.  Surgery performed 18 for   Open reduction with internal fixation with K-wire fixation of right  small finger metacarpal shaft fracture.  Pin removed 10/10/18     RESTRICTIONS/PRECAUTIONS:    MD note: 10/10/18   begin formal range of motion, home range of motion program provided with the patient for the finger active as tolerated MP and IP joints     He may begin some light strengthening starting in several weeks for the hand,

## 2018-10-23 ENCOUNTER — HOSPITAL ENCOUNTER (OUTPATIENT)
Dept: OCCUPATIONAL THERAPY | Age: 23
Setting detail: THERAPIES SERIES
Discharge: HOME | End: 2018-10-23
Payer: COMMERCIAL

## 2018-10-23 PROCEDURE — 97110 THERAPEUTIC EXERCISES: CPT | Performed by: OCCUPATIONAL THERAPIST

## 2018-10-23 PROCEDURE — 97140 MANUAL THERAPY 1/> REGIONS: CPT | Performed by: OCCUPATIONAL THERAPIST

## 2018-11-07 ENCOUNTER — OFFICE VISIT (OUTPATIENT)
Dept: ORTHOPEDIC SURGERY | Age: 23
End: 2018-11-07

## 2018-11-07 DIAGNOSIS — S62.326A CLOSED DISPLACED FRACTURE OF SHAFT OF FIFTH METACARPAL BONE OF RIGHT HAND, INITIAL ENCOUNTER: Primary | ICD-10-CM

## 2018-11-07 PROCEDURE — 99024 POSTOP FOLLOW-UP VISIT: CPT | Performed by: ORTHOPAEDIC SURGERY

## (undated) DEVICE — CURITY STRETCH BANDAGE: Brand: CURITY

## (undated) DEVICE — PADDING CAST W4INXL4YD HIGHLY ABSRB THAN COT EZ APPL

## (undated) DEVICE — ELECTRODE PT RET AD L9FT HI MOIST COND ADH HYDRGEL CORDED

## (undated) DEVICE — ZIMMER® STERILE DISPOSABLE TOURNIQUET CUFF WITH PLC, DUAL PORT, SINGLE BLADDER, 18 IN. (46 CM)

## (undated) DEVICE — 1010 S-DRAPE TOWEL DRAPE 10/BX: Brand: STERI-DRAPE™

## (undated) DEVICE — SUTURE FIBERWIRE 4-0 L18IN NONABSORBABLE BLU L12.3MM 3/8 AR723001

## (undated) DEVICE — SURE SET-DOUBLE BASIN-LF: Brand: MEDLINE INDUSTRIES, INC.

## (undated) DEVICE — CHLORAPREP 26ML ORANGE

## (undated) DEVICE — PODIATRY PK

## (undated) DEVICE — GOWN,PREVENTION PLUS,XLN/XL,ST,24/CS: Brand: MEDLINE

## (undated) DEVICE — PACK,BASIC: Brand: MEDLINE

## (undated) DEVICE — GOWN,SIRUS,POLYRNF,SETINSLV,L,20/CS: Brand: MEDLINE

## (undated) DEVICE — BIPOLAR CABLE FLYING LEAD 12 FEET (3.7 M): Brand: MEGADYNE

## (undated) DEVICE — DISCONTINUED NO SUB IDED TG GLOVE SURG SENSICARE ALOE LT LF PF ST GRN SZ 8

## (undated) DEVICE — SPONGE GZ W4XL8IN COT WVN 12 PLY

## (undated) DEVICE — CANNULA NSL AD TBNG L7FT PVC STR NONFLARED PRNG O2 DEL W STD

## (undated) DEVICE — SUTURE VCRL SZ 4-0 L18IN ABSRB UD L19MM PS-2 3/8 CIR PRIM J496H

## (undated) DEVICE — DRESSING PETRO W3XL8IN N ADH KNIT CELOS ACETT ADPTC

## (undated) DEVICE — BANDAGE COMPR W1INXL5YD BGE E ADH TENSOPLAST

## (undated) DEVICE — SUTURE PDS II SZ 4-0 L18IN ABSRB UD L19MM PS-2 3/8 CIR PRIM Z496G

## (undated) DEVICE — DRAPE 70X60IN SPLIT IMPERV ADHES STRIP

## (undated) DEVICE — SPLINT PLSTR OF PARIS W4XL15IN EXTRA FAST SET GYPS S

## (undated) DEVICE — SOLUTION IV 1000ML 0.9% SOD CHL

## (undated) DEVICE — STOCKINETTE,DOUBLE PLY,6X48,STERILE: Brand: MEDLINE

## (undated) DEVICE — UNDERGLOVE SURG SZ 8 BLU LTX FREE SYN POLYISOPRENE POLYMER

## (undated) DEVICE — GAUZE,SPONGE,4"X4",16PLY,XRAY,STRL,LF: Brand: MEDLINE

## (undated) DEVICE — FRAZIER SUCTION INSTRUMENT 10 FR W/CONTROL VENT & OBTURATOR: Brand: FRAZIER

## (undated) DEVICE — DRAPE SURGICAL HAND PROX AURORA

## (undated) DEVICE — BANDAGE COBAN 4 IN COMPR W4INXL5YD FOAM COHESIVE QUIK STK SELF ADH SFT

## (undated) DEVICE — SUTURE ETHLN SZ 4-0 L18IN NONABSORBABLE BLK L19MM PS-2 3/8 1667H

## (undated) DEVICE — GOWN,SIRUS,POLYRNF,SETINSLV,XL,20/CS: Brand: MEDLINE

## (undated) DEVICE — GARMENT,MEDLINE,DVT,INT,CALF,MED, GEN2: Brand: MEDLINE

## (undated) DEVICE — BANDAGE,ELASTIC,ESMARK,STERILE,4"X9',LF: Brand: MEDLINE

## (undated) DEVICE — COVER,MAYO STAND,XL,STERILE: Brand: MEDLINE

## (undated) DEVICE — GLOVE SURG SZ 75 L12IN FNGR THK13MIL BRN LTX SYN POLYMER W

## (undated) DEVICE — TURNOVER KIT RM INF CTRL TECH

## (undated) DEVICE — HOLDER SCALP PLAS G STD